# Patient Record
Sex: MALE | Race: WHITE | NOT HISPANIC OR LATINO | URBAN - METROPOLITAN AREA
[De-identification: names, ages, dates, MRNs, and addresses within clinical notes are randomized per-mention and may not be internally consistent; named-entity substitution may affect disease eponyms.]

---

## 2019-03-26 ENCOUNTER — OFFICE VISIT (OUTPATIENT)
Dept: URBAN - METROPOLITAN AREA CLINIC 23 | Facility: CLINIC | Age: 54
End: 2019-03-26
Payer: COMMERCIAL

## 2019-03-26 DIAGNOSIS — H18.603 KERATOCONUS, UNSPECIFIED, BILATERAL: Primary | ICD-10-CM

## 2019-03-26 PROCEDURE — 92012 INTRM OPH EXAM EST PATIENT: CPT | Performed by: OPTOMETRIST

## 2019-03-26 ASSESSMENT — KERATOMETRY
OD: 50.00
OS: 51.25

## 2019-03-26 ASSESSMENT — INTRAOCULAR PRESSURE
OS: 12
OD: 13

## 2019-03-26 ASSESSMENT — VISUAL ACUITY
OS: 20/50
OD: 20/40

## 2019-03-26 NOTE — IMPRESSION/PLAN
Impression: Keratoconus, unspecified, bilateral: H18.603. Plan: Discussed diagnosis in detail with patient. Reassured patient of current condition and treatment. Will continue to observe condition and or symptoms. Patient given new CLs today. Patient wearing x-chrome gas perm lenses for medically necessary correction.

## 2019-04-23 ENCOUNTER — OFFICE VISIT (OUTPATIENT)
Dept: URBAN - METROPOLITAN AREA CLINIC 23 | Facility: CLINIC | Age: 54
End: 2019-04-23
Payer: COMMERCIAL

## 2019-04-23 DIAGNOSIS — H52.223 REGULAR ASTIGMATISM, BILATERAL: Primary | ICD-10-CM

## 2019-04-23 PROCEDURE — 92012 INTRM OPH EXAM EST PATIENT: CPT | Performed by: OPTOMETRIST
